# Patient Record
Sex: FEMALE | Race: ASIAN | Employment: OTHER | URBAN - METROPOLITAN AREA
[De-identification: names, ages, dates, MRNs, and addresses within clinical notes are randomized per-mention and may not be internally consistent; named-entity substitution may affect disease eponyms.]

---

## 2017-06-30 ENCOUNTER — HOSPITAL ENCOUNTER (OUTPATIENT)
Dept: CV DIAGNOSTICS | Facility: HOSPITAL | Age: 62
Discharge: HOME | End: 2017-06-30
Attending: INTERNAL MEDICINE
Payer: MEDICAID

## 2017-06-30 DIAGNOSIS — R94.31 ABNORMAL EKG: ICD-10-CM

## 2017-06-30 PROCEDURE — 93018 CV STRESS TEST I&R ONLY: CPT | Performed by: INTERNAL MEDICINE

## 2017-06-30 PROCEDURE — 78452 HT MUSCLE IMAGE SPECT MULT: CPT | Performed by: INTERNAL MEDICINE

## 2017-06-30 PROCEDURE — 93017 CV STRESS TEST TRACING ONLY: CPT | Performed by: INTERNAL MEDICINE

## 2017-08-25 PROBLEM — E78.00 HIGH CHOLESTEROL: Status: ACTIVE | Noted: 2017-08-25

## 2017-09-11 ENCOUNTER — HOSPITAL ENCOUNTER (OUTPATIENT)
Dept: MAMMOGRAPHY | Age: 62
Discharge: HOME | End: 2017-09-11
Attending: INTERNAL MEDICINE
Payer: MEDICAID

## 2017-09-11 DIAGNOSIS — Z12.39 ENCOUNTER FOR SCREENING FOR MALIGNANT NEOPLASM OF BREAST: ICD-10-CM

## 2017-09-11 PROCEDURE — 77067 SCR MAMMO BI INCL CAD: CPT | Performed by: INTERNAL MEDICINE

## 2024-08-20 ENCOUNTER — HOSPITAL ENCOUNTER (EMERGENCY)
Facility: HOSPITAL | Age: 69
Discharge: HOME | End: 2024-08-20
Attending: EMERGENCY MEDICINE
Payer: MEDICARE

## 2024-08-20 ENCOUNTER — APPOINTMENT (OUTPATIENT)
Dept: CT IMAGING | Facility: HOSPITAL | Age: 69
End: 2024-08-20
Attending: EMERGENCY MEDICINE
Payer: MEDICARE

## 2024-08-20 VITALS
DIASTOLIC BLOOD PRESSURE: 72 MMHG | SYSTOLIC BLOOD PRESSURE: 129 MMHG | BODY MASS INDEX: 24.48 KG/M2 | WEIGHT: 133 LBS | RESPIRATION RATE: 18 BRPM | HEIGHT: 62 IN | TEMPERATURE: 99 F | HEART RATE: 64 BPM | OXYGEN SATURATION: 100 %

## 2024-08-20 DIAGNOSIS — G44.209 TENSION HEADACHE: Primary | ICD-10-CM

## 2024-08-20 LAB
ALBUMIN SERPL-MCNC: 4.5 G/DL (ref 3.2–4.8)
ALBUMIN/GLOB SERPL: 1.7 {RATIO} (ref 1–2)
ALP LIVER SERPL-CCNC: 44 U/L
ALT SERPL-CCNC: 43 U/L
ANION GAP SERPL CALC-SCNC: 3 MMOL/L (ref 0–18)
AST SERPL-CCNC: 42 U/L (ref ?–34)
BASOPHILS # BLD AUTO: 0.02 X10(3) UL (ref 0–0.2)
BASOPHILS NFR BLD AUTO: 0.4 %
BILIRUB SERPL-MCNC: 0.5 MG/DL (ref 0.2–1.1)
BUN BLD-MCNC: 10 MG/DL (ref 9–23)
CALCIUM BLD-MCNC: 9.7 MG/DL (ref 8.7–10.4)
CHLORIDE SERPL-SCNC: 108 MMOL/L (ref 98–112)
CO2 SERPL-SCNC: 29 MMOL/L (ref 21–32)
CREAT BLD-MCNC: 0.68 MG/DL
EGFRCR SERPLBLD CKD-EPI 2021: 95 ML/MIN/1.73M2 (ref 60–?)
EOSINOPHIL # BLD AUTO: 0.11 X10(3) UL (ref 0–0.7)
EOSINOPHIL NFR BLD AUTO: 2 %
ERYTHROCYTE [DISTWIDTH] IN BLOOD BY AUTOMATED COUNT: 13.5 %
ERYTHROCYTE [SEDIMENTATION RATE] IN BLOOD: 18 MM/HR
GLOBULIN PLAS-MCNC: 2.7 G/DL (ref 2–3.5)
GLUCOSE BLD-MCNC: 115 MG/DL (ref 70–99)
HCT VFR BLD AUTO: 36.9 %
HGB BLD-MCNC: 12.2 G/DL
IMM GRANULOCYTES # BLD AUTO: 0.01 X10(3) UL (ref 0–1)
IMM GRANULOCYTES NFR BLD: 0.2 %
LYMPHOCYTES # BLD AUTO: 1.69 X10(3) UL (ref 1–4)
LYMPHOCYTES NFR BLD AUTO: 30 %
MCH RBC QN AUTO: 29.5 PG (ref 26–34)
MCHC RBC AUTO-ENTMCNC: 33.1 G/DL (ref 31–37)
MCV RBC AUTO: 89.1 FL
MONOCYTES # BLD AUTO: 0.42 X10(3) UL (ref 0.1–1)
MONOCYTES NFR BLD AUTO: 7.5 %
NEUTROPHILS # BLD AUTO: 3.38 X10 (3) UL (ref 1.5–7.7)
NEUTROPHILS # BLD AUTO: 3.38 X10(3) UL (ref 1.5–7.7)
NEUTROPHILS NFR BLD AUTO: 59.9 %
OSMOLALITY SERPL CALC.SUM OF ELEC: 290 MOSM/KG (ref 275–295)
PLATELET # BLD AUTO: 207 10(3)UL (ref 150–450)
POTASSIUM SERPL-SCNC: 4.1 MMOL/L (ref 3.5–5.1)
PROT SERPL-MCNC: 7.2 G/DL (ref 5.7–8.2)
RBC # BLD AUTO: 4.14 X10(6)UL
SODIUM SERPL-SCNC: 140 MMOL/L (ref 136–145)
WBC # BLD AUTO: 5.6 X10(3) UL (ref 4–11)

## 2024-08-20 PROCEDURE — 70450 CT HEAD/BRAIN W/O DYE: CPT | Performed by: EMERGENCY MEDICINE

## 2024-08-20 PROCEDURE — 85025 COMPLETE CBC W/AUTO DIFF WBC: CPT | Performed by: EMERGENCY MEDICINE

## 2024-08-20 PROCEDURE — 96374 THER/PROPH/DIAG INJ IV PUSH: CPT

## 2024-08-20 PROCEDURE — 85652 RBC SED RATE AUTOMATED: CPT | Performed by: EMERGENCY MEDICINE

## 2024-08-20 PROCEDURE — 99284 EMERGENCY DEPT VISIT MOD MDM: CPT

## 2024-08-20 PROCEDURE — 80053 COMPREHEN METABOLIC PANEL: CPT | Performed by: EMERGENCY MEDICINE

## 2024-08-20 RX ORDER — KETOROLAC TROMETHAMINE 30 MG/ML
30 INJECTION, SOLUTION INTRAMUSCULAR; INTRAVENOUS ONCE
Status: COMPLETED | OUTPATIENT
Start: 2024-08-20 | End: 2024-08-20

## 2024-08-20 NOTE — ED PROVIDER NOTES
Patient Seen in: [facility] Emergency Department      History     Chief Complaint   Patient presents with    Headache     Stated Complaint: headache x 20 days, worse over past 2    Subjective:     HPI    69-year-old woman who is usually healthy and has been experiencing a persistent headache for the past three weeks. The headache has not responded to a five-day course of Aleve, a muscle relaxant, prescribed by a previous doctor. There is no history of trauma or head injury, and the individual does not typically suffer from migraines. The headache has not been accompanied by vomiting, visual disturbances, difficulty swallowing, weakness, numbness in the arms or legs, or clumsiness while walking. In addition to the headache, the individual experienced a severe episode of chest and back pain that lasted approximately half an hour. This pain was so intense that it caused a temporary increase in blood pressure. The chest and back pain resolved, but the headache persisted. No undue stress lately    Objective:   Past Medical History:    Hyperlipidemia              History reviewed. No pertinent surgical history.             Social History     Socioeconomic History    Marital status:    Tobacco Use    Smoking status: Never   Vaping Use    Vaping status: Never Used     Social Determinants of Health      Received from [facility]              Review of Systems    Positive for stated complaint: headache x 20 days, worse over past 2  Other systems are as noted in HPI.  Constitutional and vital signs reviewed.      All other systems reviewed and negative except as noted above.    Physical Exam     ED Triage Vitals [08/20/24 1234]   /88   Pulse 77   Resp 18   Temp 98.8 °F (37.1 °C)   Temp src Temporal   SpO2 98 %   O2 Device None (Room air)       Current:/72   Pulse 64   Temp 98.8 °F (37.1 °C) (Temporal)   Resp 18   Ht 157.5 cm (5' 2\")   Wt 60.3 kg   SpO2 100%   BMI 24.33 kg/m²        General:  Vitals as listed.  No acute distress   HEENT: Sclerae anicteric.  Conjunctivae show no pallor.  Oropharynx clear, mucous membranes moist   Lungs: good air exchange  Neck/back: Patient has minimal upper paracervical tenderness  Extremities: no edema, normal peripheral pulses   Neuro: Alert oriented and nonfocal no difficulty with finger-nose-finger testing      ED Course     Labs Reviewed   COMP METABOLIC PANEL (14) - Abnormal; Notable for the following components:       Result Value    Glucose 115 (*)     AST 42 (*)     Alkaline Phosphatase 44 (*)     All other components within normal limits   SED RATE, WESTERGREN (AUTOMATED) - Normal   CBC WITH DIFFERENTIAL WITH PLATELET     CT BRAIN OR HEAD (CPT=70450)    Result Date: 8/20/2024  CONCLUSION:  No acute intracranial process.    LOCATION:  [facility]   Dictated by (CST): Jarrell Ramirez MD on 8/20/2024 at 2:21 PM     Finalized by (CST): Jarrell Ramirez MD on 8/20/2024 at 2:23 PM        ED COURSE and MDM     Sources of the medical history included the patient and  and daughter    Differential diagnosis before testing included tension headache versus intracranial bleed, a medical condition that poses a threat to life.    I reviewed prior external notes including office visit to her gastroenterology PA on 8/25/2017 for colonoscopy planning    Given Toradol to relieve her headache    I have discussed with the patient the results of testing, differential diagnosis, and treatment plan. They expressed clear understanding of these instructions and agrees to the plan provided.    Disposition and Plan     Clinical Impression:  1. Tension headache         Disposition:  Discharge  8/20/2024  2:56 pm    Follow-up:  Marquez Marcelino MD  [facility]  [address] [address]  [phone]    Schedule an appointment as soon as possible for a visit in 1 week(s)          Medications Prescribed:  Discharge Medication List as of 8/20/2024  3:20 PM

## 2024-08-20 NOTE — ED INITIAL ASSESSMENT (HPI)
Patient to ED with family, reports with a headache x 20 days.  Saw PCP last week, has been on Aleve for 5 days, this morning the pain was worse.  Denies nausea or vomiting.  + sound sensitivity, no blurred vision.  Pressure starts in the base of her head, goes up the back of her head and into bilateral eyes.

## 2024-09-18 ENCOUNTER — ORDER TRANSCRIPTION (OUTPATIENT)
Dept: PHYSICAL THERAPY | Facility: HOSPITAL | Age: 69
End: 2024-09-18

## 2024-09-18 DIAGNOSIS — M54.2 NECK PAIN: ICD-10-CM

## 2024-09-18 DIAGNOSIS — M25.562 LEFT KNEE PAIN: Primary | ICD-10-CM

## 2024-09-30 ENCOUNTER — OFFICE VISIT (OUTPATIENT)
Dept: PHYSICAL THERAPY | Age: 69
End: 2024-09-30
Attending: FAMILY MEDICINE
Payer: MEDICARE

## 2024-09-30 DIAGNOSIS — M25.562 LEFT KNEE PAIN: Primary | ICD-10-CM

## 2024-09-30 DIAGNOSIS — M54.2 NECK PAIN: ICD-10-CM

## 2024-09-30 PROCEDURE — 97110 THERAPEUTIC EXERCISES: CPT

## 2024-09-30 PROCEDURE — 97162 PT EVAL MOD COMPLEX 30 MIN: CPT

## 2024-09-30 NOTE — PROGRESS NOTES
LOWER EXTREMITY EVALUATION:     Diagnosis:   Left knee pain (M25.562)       Referring Provider: Marquez Marcelino  Date of Evaluation:    9/30/2024    Precautions:  None Next MD visit:   none scheduled  Date of Surgery: n/a     PATIENT SUMMARY   Nena Moyer is a 69 year old female who presents to therapy today with complaints of L sided knee pain which has been getting worse over the last 2 years. Pt denies any mechanism of injury or event that caused onset. Pt also reports swelling as well as occasional crepitus with transitional movements. Pt denies paresthesias as well as numbness/ tingling, knee buckling or falls.    Pt describes pain level current 4/10, at best 3/10, at worst 10/10.   Current functional limitations include limited ability to ambulate over 10 minutes due to knee pain, step-to gait pattern with descending stairs and increased knee pain with transitional movements .     Nena describes prior level of function independent with increased time required. Pt goals include decrease knee pain and improve ability to navigate stairs and ambulate for over 20 minutes.  Past medical history was reviewed with Nena. Significant findings include   Past Medical History:   Diagnosis Date    Hyperlipidemia         ASSESSMENT  Nena presents to physical therapy evaluation with primary c/o L sided knee pain. The results of the objective tests and measures show a lack of knee ROM on the L when compared to the R, weakness of her foot intrinsics, quad and glute musculature bilaterally, and tightness of her ITB and gastroc bilaterally. Functional deficits include but are not limited to limited ability to navigate stairs, ambulate for over 10 minutes and UE assist required for transitional movements.  Signs and symptoms are consistent with diagnosis of chronic L sided knee pain. Pt and PT discussed evaluation findings, pathology, POC and HEP.  Pt voiced understanding and performs HEP correctly without reported  pain. Skilled Physical Therapy is medically necessary to address the above impairments and reach functional goals.     OBJECTIVE:   Palpation: Tenderness located around medial joint line, interior patellar pole and the tibial tuberosity on the L.    Sensation: Intact and equal bilaterally     AROM: (* denotes performed with pain)  Knee   Flexion: R 135; *L 130  Extension: R 0; L -7        Accessory motion: hypomobility with medial and superior patellar glides.  Extension ROM deficits at tibiofemoral joint.     Flexibility:  Hip Flexor: R Mild, L Mild  Hamstrings: R Mild; L Mild  Piriformis: R Mod; L Mod  Quads: R Mod; L Mod  Gastroc-soleus: R Mild; L Mild    Strength/MMT: (* denotes performed with pain)  Hip Knee Foot/Ankle   Flexion: R 4+/5; L 4/5  Extension: R 5/5; L 4-/5  Abduction: R 4/5; L 4-/5  ER: R 4/5; L 4-/5  IR: R 4/5; L 4-/5 Flexion: R 4+/5; *L 4-/5  Extension: R 5/5; L 4/5    DF: R 5/5; L 5/5  PF: R 5/5; L 5/5  INV: R 5/5; L 5/5  EV: R 5/5; L 5/5       Special tests:   Varus Stress Test: R -, L -  Valgus Stress Test: R -, L -  Lachman Test: R -, L -  Posterior Drawer Test: R -, L -  Single Leg Squat: R unable, L unable  5 xSTST: 30 seconds w/ +gowers sign       Gait: pt ambulates on level ground with a decreased corey, antalgia, decreased stance phase on L, decreased foot clearance bilaterally, decreased arm swing, and calcaneal inversion bilaterally increased knee valgus.  Balance: SLS: R 15 sec, L 10 sec    Today’s Treatment and Response:   Pt education was provided on exam findings, treatment diagnosis, treatment plan, expectations, and prognosis. Pt was also provided recommendations for activity modifications, possible soreness after evaluation, modalities as needed [ice/heat], postural corrections, ergonomics, detrimental fear avoidance behaviors, importance of remaining active, strategies to reduce fall risk at home, and shoe wear.  Patient was instructed in and issued a HEP for: Access Code:  [email or web address]  URL: [email or web address]  Date: 09/30/2024  Prepared by: Hernan Carver    Exercises  - Long Sitting Calf Stretch with Strap  - 1 x daily - 3-6 x weekly - 2-3 sets - 10 reps - 2-5 hold  - Gastroc Stretch on Wall  - 1 x daily - 3-6 x weekly - 3 sets - 30-40 hold  - Standing Terminal Knee Extension with Resistance  - 1 x daily - 3-5 x weekly - 3 sets - 10 reps  - Toe Yoga - Alternating Great Toe and Lesser Toe Extension  - 1 x daily - 3 x weekly - 3 sets - 10 reps  - Seated Arch Lifts  - 1 x daily - 3 x weekly - 3 sets - 10 reps  - Standing Hip Abduction with Counter Support  - 1 x daily - 3 x weekly - 3 sets - 10 reps    Charges: PT Vic Low Complexity, 22      Total Timed Treatment: 23 min     Total Treatment Time: 45 min       PLAN OF CARE:    Goals: (to be met in 10 visits)  Pt will improve knee extension ROM to 0 deg to allow proper heel strike during gait and terminal knee extension in stance   Pt will improve knee AROM flexion to >135 degrees to improve ability to perform a squat to pick items off the ground   Pt will improve quad strength to 5/5 to ascend 1 flight of stairs reciprocally without UE assist   Pt will increase hip and knee strength to grossly 4+/5 to be able to get up and down from the floor safely   Pt will demonstrate increased hip ER/ABD strength to 4+/5 to perform stepping and squatting activities without excessive femoral IR/ADD   Pt will improve SLS to 30s to improve safety with gait on uneven surfaces such as grass and gravel  Pt will be independent and compliant with comprehensive HEP to maintain progress achieved in PT     Frequency / Duration: Patient will be seen for 2 x/week or a total of 10 visits over a 90 day period. Treatment will include: Gait training, Manual Therapy, Neuromuscular Re-education, Self-Care Home Management, Therapeutic Activities, Therapeutic Exercise, Home Exercise Program instruction, and Modalities to include: as needed for pain  modulation.    Education or treatment limitation: None  Rehab Potential:good    LEFS Score  LEFS Score: 23.75 % (9/29/2024 10:55 AM)      Patient/Family/Caregiver was advised of these findings, precautions, and treatment options and has agreed to actively participate in planning and for this course of care.    Thank you for your referral. Please co-sign or sign and return this letter via fax as soon as possible to [phone]. If you have any questions, please contact me at Dept: [phone]    Sincerely,  Electronically signed by therapist: Hernan Carver PT  Physician's certification required: Yes  I certify the need for these services furnished under this plan of treatment and while under my care.    X___________________________________________________ Date____________________    Certification From: 9/30/2024  To:12/29/2024

## 2024-10-04 ENCOUNTER — OFFICE VISIT (OUTPATIENT)
Dept: PHYSICAL THERAPY | Age: 69
End: 2024-10-04
Attending: FAMILY MEDICINE
Payer: MEDICARE

## 2024-10-04 PROCEDURE — 97110 THERAPEUTIC EXERCISES: CPT

## 2024-10-04 PROCEDURE — 97140 MANUAL THERAPY 1/> REGIONS: CPT

## 2024-10-04 NOTE — PROGRESS NOTES
Diagnosis:   Left knee pain (M25.562)          Referring Provider: Marquez Marcelino  Date of Evaluation:     9/30/2024     Precautions:  None Next MD visit:   none scheduled  Date of Surgery: n/a   Insurance Primary/Secondary: BLUE CROSS MCR / N/A     # Auth Visits: 8            Subjective: Pt arrives to the clinic with reports of anterior knee soreness and pain which she states is at a 5/10 today. Pt states that she is able to walk a little longer distance if she wears proper shoe wear. No other concerns at this time.     Pain: 5/10 located anteriorly under her patella on the L.      Objective:   Palpation: Tenderness located around medial joint line, interior patellar pole and the tibial tuberosity on the L.   Accessory motion: hypomobility with medial and superior patellar glides.  Extension ROM deficits at tibiofemoral joint.      Flexibility:  Hip Flexor: R Mild, L Mild  Hamstrings: R Mild; L Mild  Piriformis: R Mod; L Mod  Quads: R Mod; L Mod  Gastroc-soleus: R Mild; L Mild       Assessment: Pt responded well to skilled therapy session with reports of a decrease in her L sided knee discomfort following exercise and manual prescription. Pt was progressed in CKC exercises that focused on quad and glute strengthening due to weakness in the aforementioned areas. Pt will continue to benefit from skilled therapy to improve knee stability and overall QOL.         Goals: (to be met in 10 visits)  Pt will improve knee extension ROM to 0 deg to allow proper heel strike during gait and terminal knee extension in stance   Pt will improve knee AROM flexion to >135 degrees to improve ability to perform a squat to pick items off the ground   Pt will improve quad strength to 5/5 to ascend 1 flight of stairs reciprocally without UE assist   Pt will increase hip and knee strength to grossly 4+/5 to be able to get up and down from the floor safely   Pt will demonstrate increased hip ER/ABD strength to 4+/5 to perform stepping  and squatting activities without excessive femoral IR/ADD   Pt will improve SLS to 30s to improve safety with gait on uneven surfaces such as grass and gravel  Pt will be independent and compliant with comprehensive HEP to maintain progress achieved in PT    Plan: Progress per POC  Date: 10/4/2024  TX#: 2/8 Date:                 TX#: 3/ Date:                 TX#: 4/ Date:                 TX#: 5/ Date:   Tx#: 6/   NuStep x6 min       TKE using green TB 3x12       ITB rolling done bilaterally + taping and light grade II/lll mobilizations        Stretch on slant board 3x45 seconds       LAQ focusing on eccentric control 3x10       Monster walks forward w/ green TB at knees        HEP: Exercises  - Long Sitting Calf Stretch with Strap  - 1 x daily - 3-6 x weekly - 2-3 sets - 10 reps - 2-5 hold  - Gastroc Stretch on Wall  - 1 x daily - 3-6 x weekly - 3 sets - 30-40 hold  - Standing Terminal Knee Extension with Resistance  - 1 x daily - 3-5 x weekly - 3 sets - 10 reps  - Toe Yoga - Alternating Great Toe and Lesser Toe Extension  - 1 x daily - 3 x weekly - 3 sets - 10 reps  - Seated Arch Lifts  - 1 x daily - 3 x weekly - 3 sets - 10 reps  - Standing Hip Abduction with Counter Support  - 1 x daily - 3 x weekly - 3 sets - 10 reps    Charges: TherX(2) MT(1)       Total Timed Treatment: 45 min  Total Treatment Time: 45 min

## 2024-10-07 ENCOUNTER — OFFICE VISIT (OUTPATIENT)
Dept: PHYSICAL THERAPY | Age: 69
End: 2024-10-07
Attending: FAMILY MEDICINE
Payer: MEDICARE

## 2024-10-07 PROCEDURE — 97110 THERAPEUTIC EXERCISES: CPT

## 2024-10-07 PROCEDURE — 97140 MANUAL THERAPY 1/> REGIONS: CPT

## 2024-10-07 NOTE — PROGRESS NOTES
Diagnosis:   Left knee pain (M25.562)          Referring Provider: Marqeuz Marcelino  Date of Evaluation:     9/30/2024     Precautions:  None Next MD visit:   none scheduled  Date of Surgery: n/a   Insurance Primary/Secondary: BLUE CROSS MCR / N/A     # Auth Visits: 8            Subjective: Pt arrives to the clinic with reports of improvement from her last session when it comes to her L sided knee discomfort. Pt states that the kinesio-taping seemed to help with reports of less knee pain. No other concerns at this time.     Pain: 1/10 located anteriorly under her patella on the L but very minimal      Objective:   Palpation: Tenderness located around medial joint line, interior patellar pole and the tibial tuberosity on the L.   Accessory motion: hypomobility with medial and superior patellar glides.  Extension ROM deficits at tibiofemoral joint.      Flexibility:  Hip Flexor: R Mild, L Mild  Hamstrings: R Mild; L Mild  Piriformis: R Mod; L Mod  Quads: R Mod; L Mod  Gastroc-soleus: R Mild; L Mild       Assessment: Pt responded well to skilled therapy session without an increase in L sided knee discomfort following exercise progression. Pt was progressed in CKC exercises that focused on quad and glute strengthening due to weakness in the aforementioned areas. Pt is still lacking ~5 degrees of knee extension on the L. Pt will continue to benefit from skilled therapy to improve knee stability and overall QOL.         Goals: (to be met in 10 visits)  Pt will improve knee extension ROM to 0 deg to allow proper heel strike during gait and terminal knee extension in stance   Pt will improve knee AROM flexion to >135 degrees to improve ability to perform a squat to pick items off the ground   Pt will improve quad strength to 5/5 to ascend 1 flight of stairs reciprocally without UE assist   Pt will increase hip and knee strength to grossly 4+/5 to be able to get up and down from the floor safely   Pt will demonstrate  increased hip ER/ABD strength to 4+/5 to perform stepping and squatting activities without excessive femoral IR/ADD   Pt will improve SLS to 30s to improve safety with gait on uneven surfaces such as grass and gravel  Pt will be independent and compliant with comprehensive HEP to maintain progress achieved in PT    Plan: Progress per POC  Date: 10/4/2024  TX#: 2/8 Date:10/7/2024                 TX#: 3/8 Date:                 TX#: 4/ Date:                 TX#: 5/ Date:   Tx#: 6/   NuStep x6 min NuStep x6 min      TKE using green TB 3x12 TKE using green TB 3x12      ITB rolling done bilaterally + taping and light grade II/lll mobilizations  ITB rolling done bilaterally + taping and light grade II/lll mobilizations w/ extension bias      Stretch on slant board 3x45 seconds Squats using shuttle 3x12      LAQ focusing on eccentric control 3x10 Quad setting +leg lift      Monster walks forward w/ green TB at knees  Monster walks forward w/ green TB at knees        Quad stretch 3x45 sec      HEP: Exercises  - Long Sitting Calf Stretch with Strap  - 1 x daily - 3-6 x weekly - 2-3 sets - 10 reps - 2-5 hold  - Gastroc Stretch on Wall  - 1 x daily - 3-6 x weekly - 3 sets - 30-40 hold  - Standing Terminal Knee Extension with Resistance  - 1 x daily - 3-5 x weekly - 3 sets - 10 reps  - Toe Yoga - Alternating Great Toe and Lesser Toe Extension  - 1 x daily - 3 x weekly - 3 sets - 10 reps  - Seated Arch Lifts  - 1 x daily - 3 x weekly - 3 sets - 10 reps  - Standing Hip Abduction with Counter Support  - 1 x daily - 3 x weekly - 3 sets - 10 reps    Charges: TherX(2) MT(1)       Total Timed Treatment: 45 min  Total Treatment Time: 45 min

## 2024-10-09 ENCOUNTER — OFFICE VISIT (OUTPATIENT)
Dept: PHYSICAL THERAPY | Age: 69
End: 2024-10-09
Attending: FAMILY MEDICINE
Payer: MEDICARE

## 2024-10-09 PROCEDURE — 97140 MANUAL THERAPY 1/> REGIONS: CPT

## 2024-10-09 PROCEDURE — 97110 THERAPEUTIC EXERCISES: CPT

## 2024-10-09 NOTE — PROGRESS NOTES
Diagnosis:   Left knee pain (M25.562)          Referring Provider: Marquez Marcelino  Date of Evaluation:     9/30/2024     Precautions:  None Next MD visit:   none scheduled  Date of Surgery: n/a   Insurance Primary/Secondary: BLUE CROSS MCR / N/A     # Auth Visits: 8            Subjective: Pt arrives to the clinic with reports of improvement from her last session when it comes to her L sided knee discomfort. Pt states that she is not currently experiencing any knee pain but did once she woke up this morning but the pain was minimal. No other concerns at this time.     Pain: 1/10 located anteriorly under her patella on the L but very minimal      Objective:   Palpation: Tenderness located around medial joint line, interior patellar pole and the tibial tuberosity on the L.   Accessory motion: hypomobility with medial and superior patellar glides.  Extension ROM deficits at tibiofemoral joint.      Flexibility:  Hip Flexor: R Mild, L Mild  Hamstrings: R Mild; L Mild  Piriformis: R Mod; L Mod  Quads: R Mod; L Mod  Gastroc-soleus: R Mild; L Mild       Assessment: Pt responded well to skilled therapy session without an increase in L sided knee discomfort following exercise progression. Pt was progressed in CKC exercises that focused on quad and glute strengthening due to weakness in the aforementioned areas. Pt is still lacking a couple degrees of knee extension on the L compared to the R. Pt will continue to benefit from skilled therapy to improve knee stability and overall QOL.         Goals: (to be met in 10 visits)  Pt will improve knee extension ROM to 0 deg to allow proper heel strike during gait and terminal knee extension in stance   Pt will improve knee AROM flexion to >135 degrees to improve ability to perform a squat to pick items off the ground   Pt will improve quad strength to 5/5 to ascend 1 flight of stairs reciprocally without UE assist   Pt will increase hip and knee strength to grossly 4+/5 to be  able to get up and down from the floor safely   Pt will demonstrate increased hip ER/ABD strength to 4+/5 to perform stepping and squatting activities without excessive femoral IR/ADD   Pt will improve SLS to 30s to improve safety with gait on uneven surfaces such as grass and gravel  Pt will be independent and compliant with comprehensive HEP to maintain progress achieved in PT    Plan: Progress per POC  Date: 10/4/2024  TX#: 2/8 Date:10/7/2024                 TX#: 3/8 Date:                 TX#: 4/ Date:                 TX#: 5/ Date:   Tx#: 6/   NuStep x6 min NuStep x6 min NuStep x6 min     TKE using green TB 3x12 TKE using green TB 3x12 Step-ups using 3 inch box w/ TB behind the knee 3x10 per side     ITB rolling done bilaterally + taping and light grade II/lll mobilizations  ITB rolling done bilaterally + taping and light grade II/lll mobilizations w/ extension bias TB rolling done bilaterally + taping and light grade II/lll mobilizations w/ extension bias     Stretch on slant board 3x45 seconds Squats using shuttle 3x12 TKE w/ ball against wall 3x10     LAQ focusing on eccentric control 3x10 Quad setting +leg lift Standing hip abd & ext standing on foam 3x10     Monster walks forward w/ green TB at knees  Monster walks forward w/ green TB at knees  Monster walks forward w/ green TB at knees      Quad stretch 3x45 sec Quad stretch 3x45 sec     HEP: Exercises  - Long Sitting Calf Stretch with Strap  - 1 x daily - 3-6 x weekly - 2-3 sets - 10 reps - 2-5 hold  - Gastroc Stretch on Wall  - 1 x daily - 3-6 x weekly - 3 sets - 30-40 hold  - Standing Terminal Knee Extension with Resistance  - 1 x daily - 3-5 x weekly - 3 sets - 10 reps  - Toe Yoga - Alternating Great Toe and Lesser Toe Extension  - 1 x daily - 3 x weekly - 3 sets - 10 reps  - Seated Arch Lifts  - 1 x daily - 3 x weekly - 3 sets - 10 reps  - Standing Hip Abduction with Counter Support  - 1 x daily - 3 x weekly - 3 sets - 10 reps    Charges: TherX(2)  MT(1)       Total Timed Treatment: 45 min  Total Treatment Time: 45 min

## 2024-10-10 ENCOUNTER — OFFICE VISIT (OUTPATIENT)
Dept: NEUROLOGY | Facility: CLINIC | Age: 69
End: 2024-10-10
Payer: MEDICARE

## 2024-10-10 VITALS
SYSTOLIC BLOOD PRESSURE: 130 MMHG | BODY MASS INDEX: 25 KG/M2 | DIASTOLIC BLOOD PRESSURE: 72 MMHG | WEIGHT: 137 LBS | HEART RATE: 74 BPM | RESPIRATION RATE: 16 BRPM

## 2024-10-10 DIAGNOSIS — M54.81 BILATERAL OCCIPITAL NEURALGIA: Primary | ICD-10-CM

## 2024-10-10 PROCEDURE — 99205 OFFICE O/P NEW HI 60 MIN: CPT | Performed by: OTHER

## 2024-10-10 RX ORDER — ALENDRONATE SODIUM 35 MG/1
35 TABLET ORAL
COMMUNITY

## 2024-10-10 NOTE — PROGRESS NOTES
HPI:    Patient ID: Nena Moyer is a 69 year old female.    HPI      She is accompanied by her daughter who helps with the history.  She told me that at younger ages she used to get bad migraines.  She was in [place] and when she returned beginning July she started experiencing severe headaches that are mostly occipital radiating to bilateral temples and all the way to the forehead.  These headaches typically resolve when she sleeps and they occur soon after she wakes up and they tend to worsen throughout the day.  It is mostly a sharp pain that she is experiencing  She takes ibuprofen and Excedrin and that takes the edge off her headaches but the headaches are persistent and they occur almost on a daily basis as she told me.  She was given a sample of Ubrelvy by her primary care physician and that seems to work but only transiently.  As soon as the medication wears off the headache recurs  She denies any speech changes, any vision changes, any weakness or numbness in upper or lower extremities.  No gait or balance problems  She was evaluated in the emergency department on 8/20/2024 and a CT scan of the head was obtained which did not reveal any acute intracranial abnormalities  Denies fever and neck stiffness for me  HISTORY:  Past Medical History:    Hyperlipidemia      No past surgical history on file.   No family history on file.   Social History     Socioeconomic History    Marital status:    Tobacco Use    Smoking status: Never   Vaping Use    Vaping status: Never Used   Substance and Sexual Activity    Alcohol use: Never    Drug use: Never   Other Topics Concern    Caffeine Concern Yes     Comment: tea    Exercise Yes     Comment: yoga and PT     Social Drivers of Health      Received from [facility]        Review of Systems  Negative except as in HPI       Current Outpatient Medications   Medication Sig Dispense Refill    alendronate 35 MG Oral Tab Take 1 tablet (35 mg total) by mouth  every 7 days.      Naproxen Sodium (ALEVE OR) Take by mouth.      simvastatin 20 MG Oral Tab TK 1 T PO QAM  1     Allergies:Allergies[1]  PHYSICAL EXAM:   Physical Exam    General Appearance: Well nourished, well developed, no apparent distress.     HEENT: Normocephalic and atraumatic. Normal sclera.  Neck: Normal range of motion. Neck supple.  Extreme tenderness on palpation of her occipital areas bilaterally worse on her left side  Pulmonary/Chest: Effort normal     Mental Status Exam: Patient is awake, alert and oriented to person, place and time with normal memory, fund of knowledge, attention/concentration and language.    Cranial Nerves: funduscopic exam is normal  II: Visual fields: normal to confrontation test  III: Pupils: equal, round, reactive to light, NO APD  III,IV,VI: Normal EOM. Saccades  V: Facial sensation: intact  VII: Facial strength: Normal  VIII: Hearing: intact  IX: Palate elevates symmetrically  XI: Shoulder shrug: symmetric  XII: Tongue protrudes in midline    Motor Exam: Normal tone. Strength is  5 out of 5 in proximal and distal muscles of upper and lower extremities  DTR: 2+ and symmetric. Downgoing toes bilaterally    Sensory: Normal sensation to superficial touch, vibration and joint position sense in upper extremities.  Normal sensation to superficial touch, vibration and joint position sense in lower extremities    Coordination: Normal finger-nose-finger test     Gait: Stands up and walk unassisted.  No shuffling no freezing of gait.  Normal arm swings bilaterally.  Normal stride.  Normal postural reflexes    TESTS/IMAGING:     CT head        ASSESSMENT/PLAN:   No diagnosis found.    No orders of the defined types were placed in this encounter.    Occipital neuralgia;  I told her she may have occipital neuralgia and I offered occipital nerve block.  She will think about it and get back with me.  I explained risk and benefit of the procedure for her in details  I personally reviewed CT  scan of the head and there is no structural lesion to explain her headache.  Also her neurological exam is normal in clinic today  I told the daughter I will see her in 3 months regardless of the decision she makes about occipital nerve block    Thank you for allowing us to participate in your patient's care.      Please do not hesitate to call if you have any questions.   I will continue to follow with you and will make further recommendations based on their progress.     60 total minutes spent with patient >50% of visit was spent in counseling and coordination of care      Meds This Visit:  Requested Prescriptions      No prescriptions requested or ordered in this encounter       Imaging & Referrals:  None     ID#[id number]       [1] No Known Allergies

## 2024-10-10 NOTE — PATIENT INSTRUCTIONS
1- You have occipital neuralgia    2- We can proceed with occipital nerve block if you wish                Refill policies:    Allow 2-3 business days for refills; controlled substances may take longer.  Contact your pharmacy at least 5 days prior to running out of medication and have them send an electronic request or submit request through the “request refill” option in your [employer] account.  Refills are not addressed on weekends; covering physicians do not authorize routine medications on weekends.  No narcotics or controlled substances are refilled after noon on Fridays or by on call physicians.  By law, narcotics must be electronically prescribed.  A 30 day supply with no refills is the maximum allowed.  If your prescription is due for a refill, you may be due for a follow up appointment.  To best provide you care, patients receiving routine medications need to be seen at least once a year.  Patients receiving narcotic/controlled substance medications need to be seen at least once every 3 months.  In the event that your preferred pharmacy does not have the requested medication in stock (e.g. Backordered), it is your responsibility to find another pharmacy that has the requested medication available.  We will gladly send a new prescription to that pharmacy at your request.    Scheduling Tests:    If your physician has ordered radiology tests such as MRI or CT scans, please contact Central Scheduling at [phone] right away to schedule the test.  Once scheduled, the [facility] Centralized Referral Team will work with your insurance carrier to obtain pre-certification or prior authorization.  Depending on your insurance carrier, approval may take 3-10 days.  It is highly recommended patients assure they have received an authorization before having a test performed.  If test is done without insurance authorization, patient may be responsible for the entire amount billed.      Precertification and Prior  Authorizations:  If your physician has recommended that you have a procedure or additional testing performed the [facility] Centralized Referral Team will contact your insurance carrier to obtain pre-certification or prior authorization.    You are strongly encouraged to contact your insurance carrier to verify that your procedure/test has been approved and is a COVERED benefit.  Although the [facility] Centralized Referral Team does its due diligence, the insurance carrier gives the disclaimer that \"Although the procedure is authorized, this does not guarantee payment.\"    Ultimately the patient is responsible for payment.   Thank you for your understanding in this matter.  Paperwork Completion:  If you require FMLA or disability paperwork for your recovery, please make sure to either drop it off or have it faxed to our office at [phone]. Be sure the form has your name and date of birth on it.  The form will be faxed to our Forms Department and they will complete it for you.  There is a 25$ fee for all forms that need to be filled out.  Please be aware there is a 10-14 day turnaround time.  You will need to sign a release of information (SIMONA) form if your paperwork does not come with one.  You may call the Forms Department with any questions at [phone].  Their fax number is [phone].

## 2024-10-14 ENCOUNTER — OFFICE VISIT (OUTPATIENT)
Dept: PHYSICAL THERAPY | Age: 69
End: 2024-10-14
Attending: FAMILY MEDICINE
Payer: MEDICARE

## 2024-10-14 PROCEDURE — 97110 THERAPEUTIC EXERCISES: CPT

## 2024-10-14 PROCEDURE — 97140 MANUAL THERAPY 1/> REGIONS: CPT

## 2024-10-14 NOTE — PROGRESS NOTES
Diagnosis:   Left knee pain (M25.562)          Referring Provider: Marquez Marcelino  Date of Evaluation:     9/30/2024     Precautions:  None Next MD visit:   none scheduled  Date of Surgery: n/a   Insurance Primary/Secondary: BLUE CROSS MCR / N/A     # Auth Visits: 8            Subjective: Pt arrives to the clinic with reports of soreness following her last therapy session which lasted 3 days following. Pt states that she experienced pain in the area of her quads bilaterally as well as cramping at night. Pt states that she is not currently experiencing any knee pain but an occasional pain located at the inferior pole of her patella . No other concerns at this time.     Pain: 1/10 located anteriorly under her patella on the L but very minimal.       Objective:   Palpation: Tenderness located around medial joint line, interior patellar pole and the tibial tuberosity on the L.   Accessory motion: hypomobility with medial and superior patellar glides.  Extension ROM deficits at tibiofemoral joint on L.      Flexibility:  Hip Flexor: R Mild, L Mild  Hamstrings: R Mild; L Mild  Piriformis: R Mod; L Mod  Quads: R Mod; L Mod  Gastroc-soleus: R Mild; L Mild       Assessment: Pt responded well to skilled therapy session without an increase in L sided knee discomfort following exercise progression. Pt was progressed in CKC exercises that focused on quad and glute strengthening due to weakness in the aforementioned areas. Pt is still lacking a couple degrees of knee extension on the L compared to the R. Pt will continue to benefit from skilled therapy to improve knee stability and overall QOL.         Goals: (to be met in 10 visits)  Pt will improve knee extension ROM to 0 deg to allow proper heel strike during gait and terminal knee extension in stance   Pt will improve knee AROM flexion to >135 degrees to improve ability to perform a squat to pick items off the ground   Pt will improve quad strength to 5/5 to ascend 1  flight of stairs reciprocally without UE assist   Pt will increase hip and knee strength to grossly 4+/5 to be able to get up and down from the floor safely   Pt will demonstrate increased hip ER/ABD strength to 4+/5 to perform stepping and squatting activities without excessive femoral IR/ADD   Pt will improve SLS to 30s to improve safety with gait on uneven surfaces such as grass and gravel  Pt will be independent and compliant with comprehensive HEP to maintain progress achieved in PT    Plan: Progress per POC  Date: 10/4/2024  TX#: 2/8 Date:10/7/2024                 TX#: 3/8 Date:10/9/2024                 TX#: 4/5 Date:10/14/2024                 TX#: 5/6 Date:   Tx#: 6/   NuStep x6 min NuStep x6 min NuStep x6 min NuStep x6 min lvl 3    TKE using green TB 3x12 TKE using green TB 3x12 Step-ups using 3 inch box w/ TB behind the knee 3x10 per side Forward lunge on bosu 3x10 per leg using UE assist     ITB rolling done bilaterally + taping and light grade II/lll mobilizations  ITB rolling done bilaterally + taping and light grade II/lll mobilizations w/ extension bias TB rolling done bilaterally + taping and light grade II/lll mobilizations w/ extension bias TB rolling done bilaterally + light grade II/lll mobilizations w/ extension bias    Stretch on slant board 3x45 seconds Squats using shuttle 3x12 TKE w/ ball against wall 3x10 TKE w/ ball against wall 3x10    LAQ focusing on eccentric control 3x10 Quad setting +leg lift Standing hip abd & ext standing on foam 3x10 Stretch on slant board 3x45 seconds    Monster walks forward w/ green TB at knees  Monster walks forward w/ green TB at knees  Monster walks forward w/ green TB at knees Hook-lying hip adduction squeezes 3x10     Quad stretch 3x45 sec Quad stretch 3x45 sec     HEP: Exercises  - Long Sitting Calf Stretch with Strap  - 1 x daily - 3-6 x weekly - 2-3 sets - 10 reps - 2-5 hold  - Gastroc Stretch on Wall  - 1 x daily - 3-6 x weekly - 3 sets - 30-40 hold  -  Standing Terminal Knee Extension with Resistance  - 1 x daily - 3-5 x weekly - 3 sets - 10 reps  - Toe Yoga - Alternating Great Toe and Lesser Toe Extension  - 1 x daily - 3 x weekly - 3 sets - 10 reps  - Seated Arch Lifts  - 1 x daily - 3 x weekly - 3 sets - 10 reps  - Standing Hip Abduction with Counter Support  - 1 x daily - 3 x weekly - 3 sets - 10 reps    Charges: TherX(2) MT(1)       Total Timed Treatment: 45 min  Total Treatment Time: 45 min

## 2024-10-16 ENCOUNTER — OFFICE VISIT (OUTPATIENT)
Dept: PHYSICAL THERAPY | Age: 69
End: 2024-10-16
Attending: FAMILY MEDICINE
Payer: MEDICARE

## 2024-10-16 PROCEDURE — 97140 MANUAL THERAPY 1/> REGIONS: CPT

## 2024-10-16 PROCEDURE — 97110 THERAPEUTIC EXERCISES: CPT

## 2024-10-16 NOTE — PROGRESS NOTES
Diagnosis:   Left knee pain (M25.562)          Referring Provider: Marquez Marcelino  Date of Evaluation:     9/30/2024     Precautions:  None Next MD visit:   none scheduled  Date of Surgery: n/a   Insurance Primary/Secondary: BLUE CROSS MCR / N/A     # Auth Visits: 8           Discharge Summary  Pt has attended 6 visits in Physical Therapy. Pt has displayed improvement in her BLE strength as well as L sided knee pain free ROM. Pt has met all but one of her goals set at the start of therapy. Pt's only goal that was not met was her extension ROM goal but she displayed improvement from lacking 7 degrees of extension to now 2 degree loss. Pt is confident that she will be able to continue to complete her HEP on a consistent basis for continued maintenance and is confident in discharge from therapy.    Subjective: Pt arrives to the clinic with reports of soreness following her last therapy session which lasted 3 days following. Pt states that she experienced pain in the area of her quads bilaterally as well as cramping at night. Pt states that she is not currently experiencing any knee pain but an occasional pain located at the inferior pole of her patella . No other concerns at this time.     Pain: 1/10 located anteriorly under her patella on the L but very minimal.       Objective:   Palpation: Tenderness located around medial joint line, interior patellar pole and the tibial tuberosity on the L.   Accessory motion: WNL medial and superior patellar glides.  Extension ROM deficits at tibiofemoral joint on L.   Knee ROM:  Extension R:0 degrees, L: -2 degrees     Flexibility:  Hip Flexor: R Mild, L Mild  Hamstrings: R Mild; L Mild  Piriformis: R Mod; L Mod  Quads: R Mod; L Mod  Gastroc-soleus: R Mild; L Mild       Assessment: Pt responded well to skilled therapy session without an increase in L sided knee discomfort following exercise progression. Pt was progressed in CKC exercises that focused on quad and glute  strengthening due to weakness in the aforementioned areas. Pt is still lacking a couple degrees of knee extension on the L compared to the R. Pt will continue to benefit from skilled therapy to improve knee stability and overall QOL.         Goals: (to be met in 10 visits)  Pt will improve knee extension ROM to 0 deg to allow proper heel strike during gait and terminal knee extension in stance.   Pt will improve knee AROM flexion to >135 degrees to improve ability to perform a squat to pick items off the ground. MET  Pt will improve quad strength to 5/5 to ascend 1 flight of stairs reciprocally without UE assist. MET  Pt will increase hip and knee strength to grossly 4+/5 to be able to get up and down from the floor safely. MET   Pt will demonstrate increased hip ER/ABD strength to 4+/5 to perform stepping and squatting activities without excessive femoral IR/ADD. MET  Pt will improve SLS to 30s to improve safety with gait on uneven surfaces such as grass and gravel. MET  Pt will be independent and compliant with comprehensive HEP to maintain progress achieved in PT.MET    Plan: Progress per POC  Date: 10/4/2024  TX#: 2/8 Date:10/7/2024                 TX#: 3/8 Date:10/9/2024                 TX#: 4/5 Date:10/14/2024                 TX#: 5/6 Date: 10/16/2024   Tx#: 6/6   NuStep x6 min NuStep x6 min NuStep x6 min NuStep x6 min lvl 3 NuStep x6 min lvl 3   TKE using green TB 3x12 TKE using green TB 3x12 Step-ups using 3 inch box w/ TB behind the knee 3x10 per side Forward lunge on bosu 3x10 per leg using UE assist  Forward lunge on bosu 3x10 per leg using UE assist    ITB rolling done bilaterally + taping and light grade II/lll mobilizations  ITB rolling done bilaterally + taping and light grade II/lll mobilizations w/ extension bias TB rolling done bilaterally + taping and light grade II/lll mobilizations w/ extension bias TB rolling done bilaterally + light grade II/lll mobilizations w/ extension bias TB rolling done  bilaterally + light grade II/lll mobilizations w/ extension bias   Stretch on slant board 3x45 seconds Squats using shuttle 3x12 TKE w/ ball against wall 3x10 TKE w/ ball against wall 3x10 TKE w/ ball against wall 3x10   LAQ focusing on eccentric control 3x10 Quad setting +leg lift Standing hip abd & ext standing on foam 3x10 Stretch on slant board 3x45 seconds Stretch on slant board 3x45 seconds   Monster walks forward w/ green TB at knees  Monster walks forward w/ green TB at knees  Monster walks forward w/ green TB at knees Hook-lying hip adduction squeezes 3x10 Hamstring and gastroc stretches 3x30 seconds each    Quad stretch 3x45 sec Quad stretch 3x45 sec     HEP: Updated on 10/16/2024    Access Code: [email or web address]  URL: [email or web address]  Date: 10/16/2024  Prepared by: Hernan Carver    Exercises  - Long Sitting Calf Stretch with Strap  - 1 x daily - 3-6 x weekly - 2-3 sets - 10 reps - 2-5 hold  - Gastroc Stretch on Wall  - 1 x daily - 3-6 x weekly - 3 sets - 30-40 hold  - Prone Knee Extension Hang  - 1 x daily - 3-4 x weekly - 3 sets - 60 hold  - Toe Yoga - Alternating Great Toe and Lesser Toe Extension  - 1 x daily - 3 x weekly - 3 sets - 10 reps  - Seated Arch Lifts  - 1 x daily - 3 x weekly - 3 sets - 10 reps  - Standing Hip Abduction with Counter Support  - 1 x daily - 3 x weekly - 3 sets - 10 reps  - Standing Terminal Knee Extension at Wall with Ball  - 1 x daily - 3-5 x weekly - 3 sets - 10-12 reps  - Forward Monster Walks  - 1 x daily - 3 x weekly - 3 sets - 10 reps    Charges: TherX(2) MT(1)       Total Timed Treatment: 45 min  Total Treatment Time: 45 min      Plan: Discharge skilled Physical Therapy Treatment will include: HEP update for continued maintenance and overall progression while away from therapy       Patient/Family/Caregiver was advised of these findings, precautions, and treatment options and has agreed to actively participate in planning and for this course of  care.    Thank you for your referral. If you have any questions, please contact me at Dept: [phone].    Sincerely,  Electronically signed by therapist: Hernan Carver PT     Physician's certification required:  No  Please co-sign or sign and return this letter via fax as soon as possible to [phone].   I certify the need for these services furnished under this plan of treatment and while under my care.    X___________________________________________________ Date____________________    Certification From: 10/16/2024  To:1/14/2025